# Patient Record
Sex: FEMALE | Race: WHITE
[De-identification: names, ages, dates, MRNs, and addresses within clinical notes are randomized per-mention and may not be internally consistent; named-entity substitution may affect disease eponyms.]

---

## 2015-07-20 VITALS
SYSTOLIC BLOOD PRESSURE: 122 MMHG | DIASTOLIC BLOOD PRESSURE: 71 MMHG | SYSTOLIC BLOOD PRESSURE: 122 MMHG | DIASTOLIC BLOOD PRESSURE: 71 MMHG | DIASTOLIC BLOOD PRESSURE: 71 MMHG | DIASTOLIC BLOOD PRESSURE: 71 MMHG | SYSTOLIC BLOOD PRESSURE: 122 MMHG | SYSTOLIC BLOOD PRESSURE: 122 MMHG

## 2017-04-12 ENCOUNTER — HOSPITAL ENCOUNTER (OUTPATIENT)
Dept: HOSPITAL 44 - LAB | Age: 52
End: 2017-04-12
Attending: FAMILY MEDICINE
Payer: COMMERCIAL

## 2017-04-12 DIAGNOSIS — Z00.00: Primary | ICD-10-CM

## 2017-04-12 LAB
EGFR (AFRICAN): > 60
EGFR (NON-AFRICAN): > 60

## 2017-04-12 PROCEDURE — 36415 COLL VENOUS BLD VENIPUNCTURE: CPT

## 2017-04-12 PROCEDURE — 80053 COMPREHEN METABOLIC PANEL: CPT

## 2017-04-12 PROCEDURE — 80061 LIPID PANEL: CPT

## 2018-02-28 ENCOUNTER — HOSPITAL ENCOUNTER (OUTPATIENT)
Dept: HOSPITAL 44 - LAB | Age: 53
End: 2018-02-28
Attending: FAMILY MEDICINE
Payer: COMMERCIAL

## 2018-02-28 DIAGNOSIS — E03.9: ICD-10-CM

## 2018-02-28 DIAGNOSIS — Z00.00: Primary | ICD-10-CM

## 2018-02-28 LAB
EGFR (AFRICAN): > 60
EGFR (NON-AFRICAN): > 60

## 2018-02-28 PROCEDURE — 84443 ASSAY THYROID STIM HORMONE: CPT

## 2018-02-28 PROCEDURE — 36415 COLL VENOUS BLD VENIPUNCTURE: CPT

## 2018-02-28 PROCEDURE — 80061 LIPID PANEL: CPT

## 2018-02-28 PROCEDURE — 80053 COMPREHEN METABOLIC PANEL: CPT

## 2018-08-06 ENCOUNTER — HOSPITAL ENCOUNTER (OUTPATIENT)
Dept: HOSPITAL 44 - OPSURG | Age: 53
End: 2018-08-06
Attending: INTERNAL MEDICINE
Payer: COMMERCIAL

## 2018-08-06 DIAGNOSIS — D12.5: ICD-10-CM

## 2018-08-06 DIAGNOSIS — D12.2: Primary | ICD-10-CM

## 2018-08-06 DIAGNOSIS — Z87.891: ICD-10-CM

## 2018-08-06 PROCEDURE — 45385 COLONOSCOPY W/LESION REMOVAL: CPT

## 2018-08-06 PROCEDURE — S1016 NON-PVC INTRAVENOUS ADMINIST: HCPCS

## 2018-08-06 NOTE — GI REPORT
REFERRING PHYSICIAN:

Dr. Elsa Harley



GASTROENTEROLOGIST: 

Donald Gerhardt, MD



PROCEDURE MEDICATION:

Propofol as per anesthesia.   



INDICATIONS: 

Patient is a 52-year-old woman whose mother had colon cancer at 32.  Patient 
has had multiple polyps.   She reports she has been a chronic cigarette smoker.
   She is referred for follow up high risk surveillance.   



PROCEDURE PERFORMED: 

Colonoscopy and polypectomy.



PROCEDURE: 

An Olympus video colonoscope was advanced to the rectum and slowly advanced all 
the way to the cecum.  The appendiceal orifice and terminal ileum were normal.  
In the ascending colon, patient had 2 polyps removed between 3 mm to 4 mm in 
size and sessile with electrocautery hot snare.   In the transverse colon, no 
additional lesions notes.   Descending colon with a lot of redundancy.   In the 
sigmoid colon at 30 cm, patient had another 3 mm to 4 mm flat polyp removed 
with electrocautery.  Retroflexion of the rectum was normal.  Patient tolerated 
the procedure well. 



FINDINGS: 

Three polyps removed.



RECOMMENDATIONS: 

1.   A high-fiber diet. 

2.   Follow-up colonoscopy within 5 years pending the pathology of the polyp.

3.   Again, I recommended the patient discontinue tobacco usage. 

4.   She should follow up with Dr. Harley





cc:   Dr. Elsa Harley
[facility]

## 2019-10-16 ENCOUNTER — HOSPITAL ENCOUNTER (OUTPATIENT)
Dept: HOSPITAL 44 - LAB | Age: 54
Discharge: HOME | End: 2019-10-16
Attending: FAMILY MEDICINE
Payer: COMMERCIAL

## 2019-10-16 DIAGNOSIS — Z13.29: ICD-10-CM

## 2019-10-16 DIAGNOSIS — Z13.220: Primary | ICD-10-CM

## 2019-10-16 LAB
EGFR (NON-AFRICAN): > 60
HDL: 62 MG/DL (ref 40–?)
TSH: 1.02 MIU/L (ref 0.47–4.68)

## 2019-10-16 PROCEDURE — 88148 CYTOPATH C/V AUTO RESCREEN: CPT

## 2019-10-16 PROCEDURE — G0143 SCR C/V CYTO,THINLAYER,RESCR: HCPCS

## 2019-10-16 PROCEDURE — 80061 LIPID PANEL: CPT

## 2019-10-16 PROCEDURE — 84443 ASSAY THYROID STIM HORMONE: CPT

## 2019-10-16 PROCEDURE — 80053 COMPREHEN METABOLIC PANEL: CPT

## 2019-10-16 PROCEDURE — 36415 COLL VENOUS BLD VENIPUNCTURE: CPT
